# Patient Record
Sex: FEMALE | Race: WHITE
[De-identification: names, ages, dates, MRNs, and addresses within clinical notes are randomized per-mention and may not be internally consistent; named-entity substitution may affect disease eponyms.]

---

## 2018-11-14 ENCOUNTER — HOSPITAL ENCOUNTER (EMERGENCY)
Dept: HOSPITAL 62 - ER | Age: 23
Discharge: HOME | End: 2018-11-14
Payer: COMMERCIAL

## 2018-11-14 VITALS — SYSTOLIC BLOOD PRESSURE: 111 MMHG | DIASTOLIC BLOOD PRESSURE: 56 MMHG

## 2018-11-14 DIAGNOSIS — L53.9: ICD-10-CM

## 2018-11-14 DIAGNOSIS — F12.10: ICD-10-CM

## 2018-11-14 DIAGNOSIS — R09.89: ICD-10-CM

## 2018-11-14 DIAGNOSIS — F17.200: ICD-10-CM
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
## 2018-11-14 DIAGNOSIS — R51: ICD-10-CM

## 2018-11-14 DIAGNOSIS — S00.83XA: Primary | ICD-10-CM

## 2018-11-14 PROCEDURE — 70450 CT HEAD/BRAIN W/O DYE: CPT

## 2018-11-14 PROCEDURE — 71045 X-RAY EXAM CHEST 1 VIEW: CPT

## 2018-11-14 PROCEDURE — 96372 THER/PROPH/DIAG INJ SC/IM: CPT

## 2018-11-14 PROCEDURE — 99284 EMERGENCY DEPT VISIT MOD MDM: CPT

## 2018-11-14 PROCEDURE — 72125 CT NECK SPINE W/O DYE: CPT
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
